# Patient Record
Sex: MALE | Race: WHITE | NOT HISPANIC OR LATINO
[De-identification: names, ages, dates, MRNs, and addresses within clinical notes are randomized per-mention and may not be internally consistent; named-entity substitution may affect disease eponyms.]

---

## 2018-06-23 ENCOUNTER — HOSPITAL (OUTPATIENT)
Dept: OTHER | Age: 63
End: 2018-06-23
Attending: FAMILY MEDICINE

## 2024-11-14 ENCOUNTER — OFFICE VISIT (OUTPATIENT)
Dept: FAMILY MEDICINE CLINIC | Facility: CLINIC | Age: 69
End: 2024-11-14
Payer: MEDICARE

## 2024-11-14 VITALS
BODY MASS INDEX: 29.97 KG/M2 | SYSTOLIC BLOOD PRESSURE: 126 MMHG | WEIGHT: 221.3 LBS | HEART RATE: 68 BPM | HEIGHT: 72 IN | TEMPERATURE: 97.8 F | RESPIRATION RATE: 16 BRPM | DIASTOLIC BLOOD PRESSURE: 82 MMHG | OXYGEN SATURATION: 97 %

## 2024-11-14 DIAGNOSIS — Z23 INFLUENZA VACCINATION ADMINISTERED AT CURRENT VISIT: ICD-10-CM

## 2024-11-14 DIAGNOSIS — Z00.00 ENCOUNTER FOR MEDICAL EXAMINATION TO ESTABLISH CARE: Primary | ICD-10-CM

## 2024-11-14 DIAGNOSIS — E78.5 HYPERLIPIDEMIA, UNSPECIFIED HYPERLIPIDEMIA TYPE: ICD-10-CM

## 2024-11-14 DIAGNOSIS — Z23 INFLUENZA VACCINE NEEDED: ICD-10-CM

## 2024-11-14 DIAGNOSIS — Z23 INFLUENZA VACCINE ADMINISTERED: ICD-10-CM

## 2024-11-14 DIAGNOSIS — I10 PRIMARY HYPERTENSION: ICD-10-CM

## 2024-11-14 DIAGNOSIS — Z12.5 PROSTATE CANCER SCREENING: ICD-10-CM

## 2024-11-14 DIAGNOSIS — E55.9 VITAMIN D DEFICIENCY: ICD-10-CM

## 2024-11-14 PROCEDURE — 99204 OFFICE O/P NEW MOD 45 MIN: CPT | Performed by: STUDENT IN AN ORGANIZED HEALTH CARE EDUCATION/TRAINING PROGRAM

## 2024-11-14 PROCEDURE — 1159F MED LIST DOCD IN RCRD: CPT | Performed by: STUDENT IN AN ORGANIZED HEALTH CARE EDUCATION/TRAINING PROGRAM

## 2024-11-14 PROCEDURE — G0008 ADMIN INFLUENZA VIRUS VAC: HCPCS | Performed by: STUDENT IN AN ORGANIZED HEALTH CARE EDUCATION/TRAINING PROGRAM

## 2024-11-14 PROCEDURE — 1160F RVW MEDS BY RX/DR IN RCRD: CPT | Performed by: STUDENT IN AN ORGANIZED HEALTH CARE EDUCATION/TRAINING PROGRAM

## 2024-11-14 PROCEDURE — 1126F AMNT PAIN NOTED NONE PRSNT: CPT | Performed by: STUDENT IN AN ORGANIZED HEALTH CARE EDUCATION/TRAINING PROGRAM

## 2024-11-14 PROCEDURE — 90662 IIV NO PRSV INCREASED AG IM: CPT | Performed by: STUDENT IN AN ORGANIZED HEALTH CARE EDUCATION/TRAINING PROGRAM

## 2024-11-14 RX ORDER — ROSUVASTATIN CALCIUM 40 MG/1
1 TABLET, COATED ORAL DAILY
COMMUNITY
Start: 2024-09-18 | End: 2024-11-14 | Stop reason: SDUPTHER

## 2024-11-14 RX ORDER — CARVEDILOL 12.5 MG/1
12.5 TABLET ORAL EVERY 12 HOURS SCHEDULED
Qty: 180 TABLET | Refills: 3 | Status: SHIPPED | OUTPATIENT
Start: 2024-11-14

## 2024-11-14 RX ORDER — ASPIRIN 81 MG/1
81 TABLET ORAL DAILY
COMMUNITY

## 2024-11-14 RX ORDER — MULTIPLE VITAMINS W/ MINERALS TAB 9MG-400MCG
1 TAB ORAL DAILY
COMMUNITY

## 2024-11-14 RX ORDER — EZETIMIBE 10 MG/1
10 TABLET ORAL DAILY
Qty: 90 TABLET | Refills: 3 | Status: SHIPPED | OUTPATIENT
Start: 2024-11-14

## 2024-11-14 RX ORDER — CHLORAL HYDRATE 500 MG
CAPSULE ORAL
COMMUNITY

## 2024-11-14 RX ORDER — ROSUVASTATIN CALCIUM 40 MG/1
40 TABLET, COATED ORAL DAILY
Qty: 90 TABLET | Refills: 3 | Status: SHIPPED | OUTPATIENT
Start: 2024-11-14

## 2024-11-14 RX ORDER — CARVEDILOL 12.5 MG/1
1 TABLET ORAL EVERY 12 HOURS SCHEDULED
COMMUNITY
Start: 2024-09-18 | End: 2024-11-14 | Stop reason: SDUPTHER

## 2024-11-14 RX ORDER — EZETIMIBE 10 MG/1
1 TABLET ORAL DAILY
COMMUNITY
Start: 2024-08-16 | End: 2024-11-14 | Stop reason: SDUPTHER

## 2024-11-14 NOTE — PROGRESS NOTES
"Chief Complaint  Establish Care, Hypertension, Hyperlipidemia, Injections (HIGH DOSE FLU VACCINE ADMINISTERED ON LEFT DELTOID AT ), Flu Vaccine (HIGH DOSE FLU VACCINE.), and Med Refill    Subjective    History of Present Illness  The patient is a 68-year-old gentleman here to establish care as a new patient.    [place] and received his influenza vaccine today. He is open to receiving the COVID-19 vaccine. His current medications include rosuvastatin 40 mg, ezetimibe 10 mg, carvedilol 12.5 mg, ascorbic acid, cholecalciferol 1000 IU, fish oil, D3, and baby aspirin. He does not monitor his blood pressure at home and requires refills for his medications.    He underwent a colonoscopy in 2023 and was advised to repeat the procedure in 5 years.    He experienced a rotator cuff tear in 2020, which was managed with physical therapy. Currently, he reports no issues with his shoulders.    He was diagnosed with esophageal stenosis in February 2023, which was treated with dilation and biopsy. He also has a medium-sized hiatal hernia. He has been experiencing a recurrence of acid reflux, particularly in the late evening, and has been using Tums for relief. He reports no difficulty swallowing or food getting stuck in his esophagus, except when consuming pizza. He was advised to resume a regular diet.    He recently recovered from a cold a couple of weeks ago. He reports no breathing issues. He was attempting to exercise but quit when he got sick.    IMMUNIZATIONS  He is up to date on his PCV20, zoster, tetanus, pneumonia, Shingrix, and COVID-19 vaccines.       Kale Villalba presents to [facility]  Hypertension    Hyperlipidemia        Objective   Vital Signs:  /82 (BP Location: Left arm, Patient Position: Sitting, Cuff Size: Adult)   Pulse 68   Temp 97.8 °F (36.6 °C) (Oral)   Resp 16   Ht 182.9 cm (72\")   Wt 100 kg (221 lb 4.8 oz)   SpO2 97%   BMI " "30.01 kg/m²   Estimated body mass index is 30.01 kg/m² as calculated from the following:    Height as of this encounter: 182.9 cm (72\").    Weight as of this encounter: 100 kg (221 lb 4.8 oz).    BMI is >= 30 and <35. (Class 1 Obesity). The following options were offered after discussion;: exercise counseling/recommendations and nutrition counseling/recommendations      Physical Exam  HENT:      Head: Normocephalic and atraumatic.      Mouth/Throat:      Mouth: Mucous membranes are moist.      Pharynx: Oropharynx is clear.   Eyes:      Extraocular Movements: Extraocular movements intact.      Conjunctiva/sclera: Conjunctivae normal.      Pupils: Pupils are equal, round, and reactive to light.   Cardiovascular:      Rate and Rhythm: Normal rate and regular rhythm.   Pulmonary:      Effort: Pulmonary effort is normal.      Breath sounds: Normal breath sounds.   Abdominal:      General: Bowel sounds are normal.      Palpations: Abdomen is soft.   Musculoskeletal:         General: Normal range of motion.      Cervical back: Neck supple.   Skin:     General: Skin is warm.      Capillary Refill: Capillary refill takes less than 2 seconds.   Neurological:      General: No focal deficit present.      Mental Status: He is alert and oriented to person, place, and time. Mental status is at baseline.   Psychiatric:         Mood and Affect: Mood normal.        Result Review :  The following data was reviewed by: Elena Roy MD on 11/14/2024:                    Assessment and Plan   Diagnoses and all orders for this visit:    1. Encounter for medical examination to establish care (Primary)    2. Influenza vaccination administered at current visit    3. Influenza vaccine administered  -     Fluzone High-Dose 65+yrs    4. Influenza vaccine needed  -     Fluzone High-Dose 65+yrs    5. Hyperlipidemia, unspecified hyperlipidemia type  -     Lipid Panel With / Chol / HDL Ratio  -     CBC Auto Differential; Future  -     " Comprehensive Metabolic Panel; Future  -     Lipid Panel With / Chol / HDL Ratio; Future  -     TSH; Future  -     Urinalysis With Microscopic - Urine, Clean Catch; Future  -     Vitamin D,25-Hydroxy; Future  -     carvedilol (COREG) 12.5 MG tablet; Take 1 tablet by mouth Every 12 (Twelve) Hours.  Dispense: 180 tablet; Refill: 3  -     ezetimibe (ZETIA) 10 MG tablet; Take 1 tablet by mouth Daily.  Dispense: 90 tablet; Refill: 3  -     rosuvastatin (CRESTOR) 40 MG tablet; Take 1 tablet by mouth Daily.  Dispense: 90 tablet; Refill: 3    6. Primary hypertension  -     CBC Auto Differential  -     Comprehensive Metabolic Panel  -     Lipid Panel With / Chol / HDL Ratio  -     CBC Auto Differential; Future  -     Comprehensive Metabolic Panel; Future  -     Lipid Panel With / Chol / HDL Ratio; Future  -     TSH; Future  -     Urinalysis With Microscopic - Urine, Clean Catch; Future  -     Vitamin D,25-Hydroxy; Future  -     carvedilol (COREG) 12.5 MG tablet; Take 1 tablet by mouth Every 12 (Twelve) Hours.  Dispense: 180 tablet; Refill: 3  -     ezetimibe (ZETIA) 10 MG tablet; Take 1 tablet by mouth Daily.  Dispense: 90 tablet; Refill: 3  -     rosuvastatin (CRESTOR) 40 MG tablet; Take 1 tablet by mouth Daily.  Dispense: 90 tablet; Refill: 3    7. Vitamin D deficiency  -     Vitamin D,25-Hydroxy; Future    8. Prostate cancer screening  -     PSA SCREENING; Future        Assessment & Plan  1. Establishment of care.  His lab results from 05/11/2024 indicate normal levels of sodium, potassium, calcium, ALT, AST, GFR, cell count, hemoglobin, and platelets. His stress test in 12/2022 was also normal. An ultrasound of the abdomen and CHUN Doppler of the extremities conducted in 10/2021 showed no evidence of hemodynamically significant inflow, outflow, or run of vascular disease. His EKG from 2021 was normal with a normal sinus rhythm and no ST or T wave changes. He is due for his 6-month labs, which will be conducted today. A  full panel, including PSA and urinalysis, will be done at his next visit. His vitamin D level will also be checked annually. His blood pressure will be rechecked today. Prescriptions for carvedilol, Zetia, and rosuvastatin have been refilled. If any abnormalities are found in his lab results, he will be contacted.    2. Rotator cuff tear.  An MRI of his shoulder in 2020 revealed a near-complete rotator cuff tear.  Problem resolved now    3. Acid reflux.  He was advised to continue taking Tums as needed. If Tums do not provide relief, he can take over-the-counter pantoprazole 40 mg daily or twice daily as needed.    Follow-up  He will follow up in 6 months for a wellness visit.            Follow Up   No follow-ups on file.  Patient was given instructions and counseling regarding his condition or for health maintenance advice. Please see specific information pulled into the AVS if appropriate.     Patient or patient representative verbalized consent for the use of Ambient Listening during the visit with  Elena Roy MD for chart documentation. 11/14/2024  12:21 EST

## 2024-11-15 LAB
ALBUMIN SERPL-MCNC: 4.2 G/DL (ref 3.5–5.2)
ALBUMIN/GLOB SERPL: 2.1 G/DL
ALP SERPL-CCNC: 49 U/L (ref 39–117)
ALT SERPL-CCNC: 32 U/L (ref 1–41)
AST SERPL-CCNC: 28 U/L (ref 1–40)
BASOPHILS # BLD AUTO: 0.05 10*3/MM3 (ref 0–0.2)
BASOPHILS NFR BLD AUTO: 0.8 % (ref 0–1.5)
BILIRUB SERPL-MCNC: 0.5 MG/DL (ref 0–1.2)
BUN SERPL-MCNC: 13 MG/DL (ref 8–23)
BUN/CREAT SERPL: 11.8 (ref 7–25)
CALCIUM SERPL-MCNC: 9.5 MG/DL (ref 8.6–10.5)
CHLORIDE SERPL-SCNC: 104 MMOL/L (ref 98–107)
CHOLEST SERPL-MCNC: 119 MG/DL (ref 0–200)
CHOLEST/HDLC SERPL: 2.48 {RATIO}
CO2 SERPL-SCNC: 27.3 MMOL/L (ref 22–29)
CREAT SERPL-MCNC: 1.1 MG/DL (ref 0.76–1.27)
EGFRCR SERPLBLD CKD-EPI 2021: 74 ML/MIN/1.73
EOSINOPHIL # BLD AUTO: 0.27 10*3/MM3 (ref 0–0.4)
EOSINOPHIL NFR BLD AUTO: 4.2 % (ref 0.3–6.2)
ERYTHROCYTE [DISTWIDTH] IN BLOOD BY AUTOMATED COUNT: 12.3 % (ref 12.3–15.4)
GLOBULIN SER CALC-MCNC: 2 GM/DL
GLUCOSE SERPL-MCNC: 94 MG/DL (ref 65–99)
HCT VFR BLD AUTO: 41.3 % (ref 37.5–51)
HDLC SERPL-MCNC: 48 MG/DL (ref 40–60)
HGB BLD-MCNC: 14 G/DL (ref 13–17.7)
IMM GRANULOCYTES # BLD AUTO: 0.03 10*3/MM3 (ref 0–0.05)
IMM GRANULOCYTES NFR BLD AUTO: 0.5 % (ref 0–0.5)
LDLC SERPL CALC-MCNC: 49 MG/DL (ref 0–100)
LYMPHOCYTES # BLD AUTO: 1.37 10*3/MM3 (ref 0.7–3.1)
LYMPHOCYTES NFR BLD AUTO: 21.1 % (ref 19.6–45.3)
MCH RBC QN AUTO: 30.3 PG (ref 26.6–33)
MCHC RBC AUTO-ENTMCNC: 33.9 G/DL (ref 31.5–35.7)
MCV RBC AUTO: 89.4 FL (ref 79–97)
MONOCYTES # BLD AUTO: 0.52 10*3/MM3 (ref 0.1–0.9)
MONOCYTES NFR BLD AUTO: 8 % (ref 5–12)
NEUTROPHILS # BLD AUTO: 4.24 10*3/MM3 (ref 1.7–7)
NEUTROPHILS NFR BLD AUTO: 65.4 % (ref 42.7–76)
NRBC BLD AUTO-RTO: 0 /100 WBC (ref 0–0.2)
PLATELET # BLD AUTO: 191 10*3/MM3 (ref 140–450)
POTASSIUM SERPL-SCNC: 4.8 MMOL/L (ref 3.5–5.2)
PROT SERPL-MCNC: 6.2 G/DL (ref 6–8.5)
RBC # BLD AUTO: 4.62 10*6/MM3 (ref 4.14–5.8)
SODIUM SERPL-SCNC: 139 MMOL/L (ref 136–145)
TRIGL SERPL-MCNC: 127 MG/DL (ref 0–150)
VLDLC SERPL CALC-MCNC: 22 MG/DL (ref 5–40)
WBC # BLD AUTO: 6.48 10*3/MM3 (ref 3.4–10.8)

## 2024-11-19 ENCOUNTER — PATIENT MESSAGE (OUTPATIENT)
Dept: FAMILY MEDICINE CLINIC | Facility: CLINIC | Age: 69
End: 2024-11-19
Payer: MEDICARE

## 2025-05-20 ENCOUNTER — APPOINTMENT (OUTPATIENT)
Dept: GENERAL RADIOLOGY | Facility: HOSPITAL | Age: 70
End: 2025-05-20
Payer: MEDICARE

## 2025-05-20 ENCOUNTER — APPOINTMENT (OUTPATIENT)
Dept: CT IMAGING | Facility: HOSPITAL | Age: 70
End: 2025-05-20
Payer: MEDICARE

## 2025-05-20 ENCOUNTER — HOSPITAL ENCOUNTER (EMERGENCY)
Facility: HOSPITAL | Age: 70
Discharge: HOME | End: 2025-05-20
Attending: EMERGENCY MEDICINE | Admitting: EMERGENCY MEDICINE
Payer: MEDICARE

## 2025-05-20 ENCOUNTER — TELEPHONE (OUTPATIENT)
Dept: FAMILY MEDICINE CLINIC | Facility: CLINIC | Age: 70
End: 2025-05-20

## 2025-05-20 VITALS
HEIGHT: 72 IN | WEIGHT: 203 LBS | BODY MASS INDEX: 27.5 KG/M2 | HEART RATE: 73 BPM | RESPIRATION RATE: 18 BRPM | OXYGEN SATURATION: 99 % | SYSTOLIC BLOOD PRESSURE: 133 MMHG | DIASTOLIC BLOOD PRESSURE: 79 MMHG | TEMPERATURE: 97.6 F

## 2025-05-20 DIAGNOSIS — I95.1 SYNCOPE DUE TO ORTHOSTATIC HYPOTENSION: Primary | ICD-10-CM

## 2025-05-20 DIAGNOSIS — R19.7 DIARRHEA OF PRESUMED INFECTIOUS ORIGIN: ICD-10-CM

## 2025-05-20 LAB
BASOPHILS # BLD AUTO: 0.02 10*3/MM3 (ref 0–0.2)
BASOPHILS NFR BLD AUTO: 0.2 % (ref 0–1.5)
BUN BLDA-MCNC: 18 MG/DL (ref 8–26)
CA-I BLDA-SCNC: 1.23 MMOL/L (ref 1.15–1.33)
CHLORIDE BLDA-SCNC: 108 MMOL/L (ref 98–107)
CO2 BLDA-SCNC: 22.3 MMOL/L (ref 23–27)
CREAT BLDA-MCNC: 1.09 MG/DL (ref 0.51–1.19)
D DIMER PPP FEU-MCNC: 4.74 MCGFEU/ML (ref 0–0.67)
DEPRECATED RDW RBC AUTO: 41.8 FL (ref 37–54)
EOSINOPHIL # BLD AUTO: 0.27 10*3/MM3 (ref 0–0.4)
EOSINOPHIL NFR BLD AUTO: 2.9 % (ref 0.3–6.2)
ERYTHROCYTE [DISTWIDTH] IN BLOOD BY AUTOMATED COUNT: 12.1 % (ref 12.3–15.4)
GEN 5 1HR TROPONIN T REFLEX: 9 NG/L
GLUCOSE BLDC GLUCOMTR-MCNC: 91 MG/DL (ref 74–100)
HCT VFR BLD AUTO: 42.9 % (ref 37.5–51)
HGB BLD-MCNC: 14.1 G/DL (ref 13–17.7)
HOLD SPECIMEN: NORMAL
HOLD SPECIMEN: NORMAL
IMM GRANULOCYTES # BLD AUTO: 0.01 10*3/MM3 (ref 0–0.05)
IMM GRANULOCYTES NFR BLD AUTO: 0.1 % (ref 0–0.5)
LYMPHOCYTES # BLD AUTO: 1.1 10*3/MM3 (ref 0.7–3.1)
LYMPHOCYTES NFR BLD AUTO: 11.9 % (ref 19.6–45.3)
MCH RBC QN AUTO: 30.5 PG (ref 26.6–33)
MCHC RBC AUTO-ENTMCNC: 32.9 G/DL (ref 31.5–35.7)
MCV RBC AUTO: 92.7 FL (ref 79–97)
MONOCYTES # BLD AUTO: 0.55 10*3/MM3 (ref 0.1–0.9)
MONOCYTES NFR BLD AUTO: 5.9 % (ref 5–12)
NEUTROPHILS NFR BLD AUTO: 7.33 10*3/MM3 (ref 1.7–7)
NEUTROPHILS NFR BLD AUTO: 79 % (ref 42.7–76)
PLATELET # BLD AUTO: 190 10*3/MM3 (ref 140–450)
PMV BLD AUTO: 11.2 FL (ref 6–12)
POTASSIUM BLDA-SCNC: 3.9 MMOL/L (ref 3.5–4.5)
QT INTERVAL: 388 MS
QTC INTERVAL: 437 MS
RBC # BLD AUTO: 4.63 10*6/MM3 (ref 4.14–5.8)
SODIUM BLD-SCNC: 140 MMOL/L (ref 138–146)
TROPONIN T NUMERIC DELTA: -1 NG/L
TROPONIN T SERPL HS-MCNC: 10 NG/L
WBC NRBC COR # BLD AUTO: 9.28 10*3/MM3 (ref 3.4–10.8)
WHOLE BLOOD HOLD COAG: NORMAL
WHOLE BLOOD HOLD SPECIMEN: NORMAL

## 2025-05-20 PROCEDURE — 71046 X-RAY EXAM CHEST 2 VIEWS: CPT

## 2025-05-20 PROCEDURE — 85025 COMPLETE CBC W/AUTO DIFF WBC: CPT | Performed by: EMERGENCY MEDICINE

## 2025-05-20 PROCEDURE — 80047 BASIC METABLC PNL IONIZED CA: CPT

## 2025-05-20 PROCEDURE — 71275 CT ANGIOGRAPHY CHEST: CPT

## 2025-05-20 PROCEDURE — 84484 ASSAY OF TROPONIN QUANT: CPT | Performed by: EMERGENCY MEDICINE

## 2025-05-20 PROCEDURE — 99285 EMERGENCY DEPT VISIT HI MDM: CPT

## 2025-05-20 PROCEDURE — 93005 ELECTROCARDIOGRAM TRACING: CPT | Performed by: EMERGENCY MEDICINE

## 2025-05-20 PROCEDURE — 85379 FIBRIN DEGRADATION QUANT: CPT | Performed by: EMERGENCY MEDICINE

## 2025-05-20 PROCEDURE — 25510000001 IOPAMIDOL PER 1 ML: Performed by: EMERGENCY MEDICINE

## 2025-05-20 PROCEDURE — 36415 COLL VENOUS BLD VENIPUNCTURE: CPT

## 2025-05-20 RX ORDER — IOPAMIDOL 755 MG/ML
100 INJECTION, SOLUTION INTRAVASCULAR
Status: COMPLETED | OUTPATIENT
Start: 2025-05-20 | End: 2025-05-20

## 2025-05-20 RX ADMIN — IOPAMIDOL 80 ML: 755 INJECTION, SOLUTION INTRAVENOUS at 13:25

## 2025-05-20 NOTE — TELEPHONE ENCOUNTER
PATIENT CALLED AND STATES HE WOKE UP LAST NIGHT TO GO TO THE RESTROOM FELT DIZZY AND PASSED OUT . HE DID NOT HIT HIS HEAD, FELL ON HIS KNEE AND SHOULDER. HE WENT BACK TO BED AFTERWARDS.  ADVISED TO GO TO ER. HE WILL GO TO [facility] ER    CALL BACK NUMBER [phone]

## 2025-05-20 NOTE — FSED PROVIDER NOTE
Subjective   History of Present Illness  70 yo male in the middle of the night stood up from the bed to go to the bathroom and felt dizzy/lightheaded and then was on the floor and out for a few seconds the thinks, did not hit his head, no injuries, no bloody nose or wounds, No headache or stiff or sore neck, no rash, no change in vision or mental function, no chest pain, back pain or abdominal pain. No fever. No nausea or vomiting, no malaise or weakness, no body aches. He went to the bathroom and had an episode of brown watery diarrhea and on the way back after standing up from the toilet felt dizzy/lightheaded again and had another episode, again no injury and for short time of the floor. He went back to bed. When he woke up this am he had another episode of brown watery diarrhea. Neither episode with abdominal pain, back pain, chest pain, fever, nausea or vomiting. He also is experiencing a slightly sore right ear, no discharge or fever. Patient had a stress test and other cardiac studies less than 5 years ago      Review of Systems    Past Medical History:   Diagnosis Date    HTN (hypertension)     Hyperlipidemia        No Known Allergies    Past Surgical History:   Procedure Laterality Date    COLONOSCOPY         Family History   Problem Relation Age of Onset    No Known Problems Mother     No Known Problems Father        Social History     Socioeconomic History    Marital status:    Tobacco Use    Smoking status: Never     Passive exposure: Past    Smokeless tobacco: Never   Vaping Use    Vaping status: Never Used   Substance and Sexual Activity    Alcohol use: Yes     Comment: OCC.    Drug use: Never    Sexual activity: Yes     Partners: Female           Objective   Physical Exam  Vitals and nursing note reviewed.   Constitutional:       General: He is not in acute distress.     Appearance: Normal appearance. He is normal weight. He is not ill-appearing or toxic-appearing.   HENT:      Head:  Normocephalic.      Mouth/Throat:      Mouth: Mucous membranes are moist.      Pharynx: Oropharynx is clear.   Eyes:      Extraocular Movements: Extraocular movements intact.      Conjunctiva/sclera: Conjunctivae normal.   Cardiovascular:      Rate and Rhythm: Normal rate and regular rhythm.      Pulses: Normal pulses.      Heart sounds: Normal heart sounds. No murmur heard.  Pulmonary:      Effort: Pulmonary effort is normal. No respiratory distress.      Breath sounds: Normal breath sounds. No stridor. No wheezing, rhonchi or rales.   Abdominal:      Palpations: Abdomen is soft.   Musculoskeletal:         General: Normal range of motion.      Cervical back: Neck supple.      Right lower leg: No edema.      Left lower leg: No edema.   Skin:     General: Skin is warm and dry.      Capillary Refill: Capillary refill takes less than 2 seconds.   Neurological:      General: No focal deficit present.      Mental Status: He is alert and oriented to person, place, and time.   Psychiatric:         Mood and Affect: Mood normal.         Behavior: Behavior normal.         Thought Content: Thought content normal.         Judgment: Judgment normal.         Procedures           ED Course  ED Course as of 05/20/25 1404   Tue May 20, 2025   0906 EKG NSR rate 76 no stemi nl QRS ST T and intervals [AR]   1009 BMP Cl 108 slightly high , CO2 22.3 slightly low, CBC w diff N% slightly high, L% slightly low [AR]   1011 Chest xray no pneumonia no mass normal mediastinum, no pneumothorax, no pleural effusions  [AR]   1027 Ddimer and troponin in process [AR]   1056 Alerted pt as to the reason of the delay and the importance of getting the labs done, he declines any po fluids at this time [AR]   1122 Troponin 10, hu [AR]   1128 DDIMER send out to main lab [AR]   1203 Second troponin negative [AR]   1302 Ddimer 4.74 pt remains asymptomatic since arrival and did not have CP dyspnea or leg discomfort before or after syncope [AR]   1357 CT PE  neg  IMPRESSION:     1. No pulmonary embolism.  2. Airways disease.  3. Three-vessel coronary artery atherosclerotic calcification.  4. Gas and fluid in the visualized colonic lumen, consistent with  diarrhea.   [AR]      ED Course User Index  [AR] Lilliana Shi MD                                           Medical Decision Making  Differential syncope includes but not limited to vasovagal, decreased heart rate, aneurysm cardiac or intracranial, hydration, seizure, sudden drop in blood pressure, chest pain, shortness of breath inner ear increased fluid and pressure, dehydration     Patient is at his baseline on arrival, asymptomatic from car to door to room    The work up today showed everything normal until the Ddimer returned suggesting blood clots. You did not have leg pain or tenderness so I did not do an ultrasound for blood clots, but we did check your chest for clots in the lungs that can cause people to pass out, though typically they have chest pain or shortness of breath. Your CT of your chest for blood clots was negative. Getting up out of a supine position can sometimes lower your blood pressure and cause feeling faint or actually passing out. You reported a brief loss pf consciousness and no injury and a repeat episode after getting of the toilet. You also had a bout of brown watery diarrhea last night and this am, which may or may not have been related. Please follow up with your doctor within 7-10 days. If anything changes that concerns you, please return to the emergency department.    He understood and agreed    Problems Addressed:  Diarrhea of presumed infectious origin: complicated acute illness or injury  Syncope due to orthostatic hypotension: complicated acute illness or injury    Amount and/or Complexity of Data Reviewed  Labs: ordered. Decision-making details documented in ED Course.  Radiology: ordered and independent interpretation performed. Decision-making details documented in ED  Course.  ECG/medicine tests: ordered and independent interpretation performed. Decision-making details documented in ED Course.    Risk  Prescription drug management.        Final diagnoses:   Syncope due to orthostatic hypotension   Diarrhea of presumed infectious origin       ED Disposition  ED Disposition       ED Disposition   Discharge    Condition   Stable    Comment   --               Elena Roy MD  [id number] [id number]  [phone]    In 1 week           Medication List      No changes were made to your prescriptions during this visit.

## 2025-05-20 NOTE — DISCHARGE INSTRUCTIONS
The work up today showed everything normal until the Ddimer returned suggesting blood clots. You did not have leg pain or tenderness so I did not do an ultrasound for blood clots, but we did check your chest for clots in the lungs that can cause people to pass out, though typically they have chest pain or shortness of breath. Your CT of your chest for blood clots was negative. Getting up out of a supine position can sometimes lower your blood pressure and cause feeling faint or actually passing out. You reported a brief loss pf consciousness and no injury and a repeat episode after getting of the toilet. You also had a bout of brown watery diarrhea last night and this am, which may or may not have been related. Please follow up with your doctor within 7-10 days. If anything changes that concerns you, please return to the emergency department.

## 2025-06-27 ENCOUNTER — OFFICE VISIT (OUTPATIENT)
Dept: FAMILY MEDICINE CLINIC | Facility: CLINIC | Age: 70
End: 2025-06-27
Payer: MEDICARE

## 2025-06-27 VITALS
OXYGEN SATURATION: 98 % | BODY MASS INDEX: 27.36 KG/M2 | TEMPERATURE: 97.9 F | HEART RATE: 74 BPM | WEIGHT: 202 LBS | HEIGHT: 72 IN | SYSTOLIC BLOOD PRESSURE: 130 MMHG | DIASTOLIC BLOOD PRESSURE: 78 MMHG | RESPIRATION RATE: 16 BRPM

## 2025-06-27 DIAGNOSIS — R31.9 HEMATURIA, UNSPECIFIED TYPE: ICD-10-CM

## 2025-06-27 DIAGNOSIS — Z00.00 ENCOUNTER FOR SUBSEQUENT ANNUAL WELLNESS VISIT (AWV) IN MEDICARE PATIENT: Primary | ICD-10-CM

## 2025-06-27 DIAGNOSIS — E78.5 HYPERLIPIDEMIA, UNSPECIFIED HYPERLIPIDEMIA TYPE: ICD-10-CM

## 2025-06-27 DIAGNOSIS — I10 PRIMARY HYPERTENSION: ICD-10-CM

## 2025-06-27 DIAGNOSIS — E55.9 VITAMIN D DEFICIENCY: ICD-10-CM

## 2025-06-28 LAB
APPEARANCE UR: CLEAR
BACTERIA #/AREA URNS HPF: ABNORMAL /HPF
BILIRUB UR QL STRIP: NEGATIVE
CASTS URNS MICRO: ABNORMAL
COLOR UR: YELLOW
EPI CELLS #/AREA URNS HPF: ABNORMAL /HPF
GLUCOSE UR QL STRIP: NEGATIVE
HGB UR QL STRIP: ABNORMAL
KETONES UR QL STRIP: NEGATIVE
LEUKOCYTE ESTERASE UR QL STRIP: NEGATIVE
NITRITE UR QL STRIP: NEGATIVE
PH UR STRIP: 6.5 [PH] (ref 5–8)
PROT UR QL STRIP: NEGATIVE
RBC #/AREA URNS HPF: ABNORMAL /HPF
SP GR UR STRIP: 1.01 (ref 1–1.03)
UROBILINOGEN UR STRIP-MCNC: ABNORMAL MG/DL
WBC #/AREA URNS HPF: ABNORMAL /HPF

## 2025-07-03 ENCOUNTER — TELEPHONE (OUTPATIENT)
Dept: FAMILY MEDICINE CLINIC | Facility: CLINIC | Age: 70
End: 2025-07-03

## 2025-07-03 NOTE — TELEPHONE ENCOUNTER
Caller: Kale Villalba    Relationship: Self    Best call back number: [phone]     What test was performed: URINE TEST - BLOOD WORK    When was the test performed: URINE 06- - BLOOD WORK ABOUT 2 WEEKS PRIOR TO URINE TEST    Where was the test performed: IN OFFICE    Additional notes: PATIENT IS REQUESTING TO KNOW THE RESULTS OF HIS RECENT TESTING.    PATIENT STATED HE IS UNABLE TO ACCESS HIS MYCHART AND CANNOT VIEW HIS RESULTS.    PLEASE CALL PATIENT TO DISCUSS AND ADVISE.

## 2025-07-09 NOTE — PROGRESS NOTES
Subjective   The ABCs of the Annual Wellness Visit  Medicare Wellness Visit      Kale Villalba is a 69 y.o. patient who presents for a Medicare Wellness Visit.    The following portions of the patient's history were reviewed and   updated as appropriate: allergies, current medications, past family history, past medical history, past social history, past surgical history, and problem list.    Compared to one year ago, the patient's physical   health is the same.  Compared to one year ago, the patient's mental   health is the same.    Recent Hospitalizations:  He was not admitted to the hospital during the last year.     Current Medical Providers:  Patient Care Team:  Elena Roy MD as PCP - General (Internal Medicine)    Outpatient Medications Prior to Visit   Medication Sig Dispense Refill    aspirin 81 MG EC tablet Take 1 tablet by mouth Daily.      carvedilol (COREG) 12.5 MG tablet Take 1 tablet by mouth Every 12 (Twelve) Hours. 180 tablet 3    ezetimibe (ZETIA) 10 MG tablet Take 1 tablet by mouth Daily. 90 tablet 3    multivitamin with minerals tablet tablet Take 1 tablet by mouth Daily.      Omega-3 Fatty Acids (fish oil) 1000 MG capsule capsule Take  by mouth Daily With Breakfast.      rosuvastatin (CRESTOR) 40 MG tablet Take 1 tablet by mouth Daily. 90 tablet 3    vitamin D3 125 MCG (5000 UT) capsule capsule Take 1 capsule by mouth Daily.       No facility-administered medications prior to visit.     No opioid medication identified on active medication list. I have reviewed chart for other potential  high risk medication/s and harmful drug interactions in the elderly.      Aspirin is on active medication list. Aspirin use is indicated based on review of current medical condition/s. Pros and cons of this therapy have been discussed today. Benefits of this medication outweigh potential harm.  Patient has been encouraged to continue taking this medication.  .      There is no problem list on file for this  "patient.    Advance Care Planning Advance Directive is not on file.  ACP discussion was held with the patient during this visit. Patient does not have an advance directive, information provided.            Objective   Vitals:    25 1012   BP: 130/78   Pulse: 74   Resp: 16   Temp: 97.9 °F (36.6 °C)   TempSrc: Oral   SpO2: 98%   Weight: 91.6 kg (202 lb)   Height: 182.9 cm (72\")   PainSc: 0-No pain       Estimated body mass index is 27.4 kg/m² as calculated from the following:    Height as of this encounter: 182.9 cm (72\").    Weight as of this encounter: 91.6 kg (202 lb).                Does the patient have evidence of cognitive impairment? No  Lab Results   Component Value Date    CHLPL 117 2025    TRIG 83 2025    HDL 54 2025    LDL 47 2025    VLDL 16 2025                                                                                                Health  Risk Assessment    Smoking Status:  Social History     Tobacco Use   Smoking Status Never    Passive exposure: Past   Smokeless Tobacco Never     Alcohol Consumption:  Social History     Substance and Sexual Activity   Alcohol Use Yes    Comment: OCC.       Fall Risk Screen  STEADI Fall Risk Assessment was completed, and patient is at MODERATE risk for falls. Assessment completed on:2025    Depression Screening   Little interest or pleasure in doing things? Not at all   Feeling down, depressed, or hopeless? Not at all   PHQ-2 Total Score 0      Health Habits and Functional and Cognitive Screenin/27/2025    10:20 AM   Functional & Cognitive Status   Do you have difficulty preparing food and eating? No   Do you have difficulty bathing yourself, getting dressed or grooming yourself? No   Do you have difficulty using the toilet? No   Do you have difficulty moving around from place to place? No   Do you have trouble with steps or getting out of a bed or a chair? No   Current Diet Limited Junk Food   Dental Exam Up to " date   Eye Exam Up to date   Exercise (times per week) 5 times per week   Current Exercises Include Cardiovascular Workout;Yard Work   Do you need help using the phone?  No   Are you deaf or do you have serious difficulty hearing?  No   Do you need help to go to places out of walking distance? No   Do you need help shopping? No   Do you need help preparing meals?  No   Do you need help with housework?  No   Do you need help with laundry? No   Do you need help taking your medications? No   Do you need help managing money? No   Do you ever drive or ride in a car without wearing a seat belt? No   Have you felt unusual fatigue (could be tiredness), stress, anger or loneliness in the last month? No   Who do you live with? Spouse   If you need help, do you have trouble finding someone available to you? No   Have you been bothered in the last four weeks by sexual problems? No   Do you have difficulty concentrating, remembering or making decisions? No           Age-appropriate Screening Schedule:  Refer to the list below for future screening recommendations based on patient's age, sex and/or medical conditions. Orders for these recommended tests are listed in the plan section. The patient has been provided with a written plan.    Health Maintenance List  Health Maintenance   Topic Date Due    COVID-19 Vaccine (5 - 2024-25 season) 09/01/2024    HEPATITIS C SCREENING  Never done    ANNUAL WELLNESS VISIT  Never done    INFLUENZA VACCINE  10/01/2025    LIPID PANEL  06/20/2026    COLORECTAL CANCER SCREENING  02/15/2028    TDAP/TD VACCINES (2 - Td or Tdap) 04/05/2028    Pneumococcal Vaccine 50+  Completed    ZOSTER VACCINE  Completed                                                                                                                                                CMS Preventative Services Quick Reference  Risk Factors Identified During Encounter  Immunizations Discussed/Encouraged: COVID19    The above risks/problems  have been discussed with the patient.  Pertinent information has been shared with the patient in the After Visit Summary.  An After Visit Summary and PPPS were made available to the patient.    Follow Up:   Next Medicare Wellness visit to be scheduled in 1 year.         Additional E&M Note during same encounter follows:  Patient has additional, significant, and separately identifiable condition(s)/problem(s) that require work above and beyond the Medicare Wellness Visit     Chief Complaint  Medicare Wellness-subsequent and Abnormal Lab (DRAWN ON 06/20/2025./)    Subjective    Abnormal Lab           The patient is a 69-year-old gentleman here for a wellness checkup.    He has been on carvedilol, taking 1 tablet twice daily, for the past decade to manage his blood pressure. He reports no side effects from this medication.    He is also on rosuvastatin and Zetia for cholesterol management, with no reported muscle pain or other side effects. He has a history of potential cardiac blockage, as indicated by a nuclear stress test. However, he has never experienced a heart attack. A CT calcium score test revealed significant blockage, but not severe enough to warrant a stent. He recalls undergoing a test involving groin access to assess for blockage. He has been on these medications prior to the stress test.    A few weeks ago, he experienced a fainting episode after feeling dizzy while going to the restroom at night. He sought emergency care the following day. He had been engaged in strenuous outdoor activity for two days prior to the episode. An echocardiogram, chest x-rays, and CAT scans were performed, all of which were normal. The only potential cause identified was dehydration leading to a drop in blood pressure.    He reports no constipation, rectal pain, or lesions. He also reports no penile discharge or pain in the penis, testicles, or scrotum. He did experience some pain during exercise while on vacation three  "weeks ago, but this has since resolved. He has not noticed any ear issues or swelling in his armpit. He also reports no recent abdominal pain, nausea, or vomiting. He has a living will in place and has not been hospitalized within the last year.          Objective   Vital Signs:  /78   Pulse 74   Temp 97.9 °F (36.6 °C) (Oral)   Resp 16   Ht 182.9 cm (72\")   Wt 91.6 kg (202 lb)   SpO2 98%   BMI 27.40 kg/m²   Physical Exam  Cardiovascular:      Rate and Rhythm: Normal rate.      Pulses: Normal pulses.      Heart sounds: Normal heart sounds.   Pulmonary:      Effort: Pulmonary effort is normal.      Breath sounds: Normal breath sounds.   Abdominal:      General: Bowel sounds are normal.      Palpations: Abdomen is soft.   Neurological:      Mental Status: He is alert and oriented to person, place, and time.           Genitourinary: No penile discharge or pain in the penis, testicles, or scrotum. Testicular exam revealed no abnormalities.            Results  Labs   - Prostate cancer screening blood test: 1.4   - Hemoglobin: Normal   - White cell count: Normal   - Platelet count: Normal   - Vitamin D level: 41.2   - Kidney function: Normal   - Liver function: Normal   - Electrolytes: Normal   - Glucose: Normal   - Urine test: Blood too numerous to count    Imaging   - CT calcium score: 2022, Score was in 1000    Diagnostic Testing   - Nuclear stress test: 12/2022, Normal   - EKG: Normal           Assessment and Plan        1. Hypertension.  - Blood pressure is well-regulated at 130/78 mmHg with the current regimen of carvedilol 1 tablet twice daily.  - No changes to the medication are necessary.    2. Hyperlipidemia.  - Cholesterol levels are within the desired range with the current treatment of rosuvastatin and Zetia.  - Advised to continue with these medications.    3. Hematuria.  - Presence of blood in urine could be indicative of an infection or a bladder polyp.  - Repeat urinalysis will be conducted " today. If hematuria persists, a referral to urology will be made for further evaluation and potential cystoscopy.    4. Syncope.  - Experienced a fainting spell likely due to dehydration and a drop in blood pressure.  - All cardiac evaluations, including troponin levels and EKG, were normal. Advised to stay hydrated and monitor for any recurrent symptoms.    5. Health Maintenance.  - Prostate cancer screening blood test yielded a result of 1.4, which is within the normal range.  - Hemoglobin, white cell count, platelet count, vitamin D level (41.2), kidney function, liver function, electrolytes, and glucose levels are all within normal limits.  - Advised to continue daily vitamin D supplementation.  - Received a pneumonia shot in 2023 and is up to date with that.  - Had a colonoscopy in February 2023 and is due for another in 5 years.  - Getting annual prostate cancer screening through blood tests. Repeat lab work will be scheduled in 6 months.         Follow Up   No follow-ups on file.  Patient was given instructions and counseling regarding his condition or for health maintenance advice. Please see specific information pulled into the AVS if appropriate.  Patient or patient representative verbalized consent for the use of Ambient Listening during the visit with  Elena Roy MD for chart documentation. 7/9/2025  10:28 EDT